# Patient Record
Sex: FEMALE | Race: BLACK OR AFRICAN AMERICAN | URBAN - METROPOLITAN AREA
[De-identification: names, ages, dates, MRNs, and addresses within clinical notes are randomized per-mention and may not be internally consistent; named-entity substitution may affect disease eponyms.]

---

## 2021-08-15 ENCOUNTER — EMERGENCY (EMERGENCY)
Facility: HOSPITAL | Age: 61
LOS: 1 days | Discharge: HOME | End: 2021-08-15
Attending: STUDENT IN AN ORGANIZED HEALTH CARE EDUCATION/TRAINING PROGRAM
Payer: MEDICARE

## 2021-08-15 VITALS
OXYGEN SATURATION: 100 % | HEIGHT: 65 IN | HEART RATE: 91 BPM | WEIGHT: 154.98 LBS | TEMPERATURE: 99 F | RESPIRATION RATE: 20 BRPM | SYSTOLIC BLOOD PRESSURE: 101 MMHG | DIASTOLIC BLOOD PRESSURE: 68 MMHG

## 2021-08-15 LAB
A1C WITH ESTIMATED AVERAGE GLUCOSE RESULT: >16.9 % — HIGH (ref 4–5.6)
ALBUMIN SERPL ELPH-MCNC: 3.9 G/DL — SIGNIFICANT CHANGE UP (ref 3.3–5.2)
ALP SERPL-CCNC: 120 U/L — SIGNIFICANT CHANGE UP (ref 40–120)
ALT FLD-CCNC: 9 U/L — SIGNIFICANT CHANGE UP
ANION GAP SERPL CALC-SCNC: 10 MMOL/L — SIGNIFICANT CHANGE UP (ref 5–17)
AST SERPL-CCNC: 13 U/L — SIGNIFICANT CHANGE UP
BASOPHILS # BLD AUTO: 0.05 K/UL — SIGNIFICANT CHANGE UP (ref 0–0.2)
BASOPHILS NFR BLD AUTO: 1 % — SIGNIFICANT CHANGE UP (ref 0–2)
BILIRUB SERPL-MCNC: 0.2 MG/DL — LOW (ref 0.4–2)
BUN SERPL-MCNC: 11.4 MG/DL — SIGNIFICANT CHANGE UP (ref 8–20)
CALCIUM SERPL-MCNC: 9.7 MG/DL — SIGNIFICANT CHANGE UP (ref 8.6–10.2)
CHLORIDE SERPL-SCNC: 97 MMOL/L — LOW (ref 98–107)
CO2 SERPL-SCNC: 25 MMOL/L — SIGNIFICANT CHANGE UP (ref 22–29)
CREAT SERPL-MCNC: 1.07 MG/DL — SIGNIFICANT CHANGE UP (ref 0.5–1.3)
EOSINOPHIL # BLD AUTO: 0.09 K/UL — SIGNIFICANT CHANGE UP (ref 0–0.5)
EOSINOPHIL NFR BLD AUTO: 1.8 % — SIGNIFICANT CHANGE UP (ref 0–6)
ESTIMATED AVERAGE GLUCOSE: >438 MG/DL — HIGH (ref 68–114)
GLUCOSE SERPL-MCNC: 359 MG/DL — HIGH (ref 70–99)
HCT VFR BLD CALC: 34.6 % — SIGNIFICANT CHANGE UP (ref 34.5–45)
HGB BLD-MCNC: 12.3 G/DL — SIGNIFICANT CHANGE UP (ref 11.5–15.5)
IMM GRANULOCYTES NFR BLD AUTO: 1 % — SIGNIFICANT CHANGE UP (ref 0–1.5)
LYMPHOCYTES # BLD AUTO: 1.17 K/UL — SIGNIFICANT CHANGE UP (ref 1–3.3)
LYMPHOCYTES # BLD AUTO: 22.8 % — SIGNIFICANT CHANGE UP (ref 13–44)
MCHC RBC-ENTMCNC: 29.9 PG — SIGNIFICANT CHANGE UP (ref 27–34)
MCHC RBC-ENTMCNC: 35.5 GM/DL — SIGNIFICANT CHANGE UP (ref 32–36)
MCV RBC AUTO: 84 FL — SIGNIFICANT CHANGE UP (ref 80–100)
MONOCYTES # BLD AUTO: 0.5 K/UL — SIGNIFICANT CHANGE UP (ref 0–0.9)
MONOCYTES NFR BLD AUTO: 9.7 % — SIGNIFICANT CHANGE UP (ref 2–14)
NEUTROPHILS # BLD AUTO: 3.27 K/UL — SIGNIFICANT CHANGE UP (ref 1.8–7.4)
NEUTROPHILS NFR BLD AUTO: 63.7 % — SIGNIFICANT CHANGE UP (ref 43–77)
PLATELET # BLD AUTO: 350 K/UL — SIGNIFICANT CHANGE UP (ref 150–400)
POTASSIUM SERPL-MCNC: 4.8 MMOL/L — SIGNIFICANT CHANGE UP (ref 3.5–5.3)
POTASSIUM SERPL-SCNC: 4.8 MMOL/L — SIGNIFICANT CHANGE UP (ref 3.5–5.3)
PROT SERPL-MCNC: 6.4 G/DL — LOW (ref 6.6–8.7)
RBC # BLD: 4.12 M/UL — SIGNIFICANT CHANGE UP (ref 3.8–5.2)
RBC # FLD: 15.1 % — HIGH (ref 10.3–14.5)
SODIUM SERPL-SCNC: 132 MMOL/L — LOW (ref 135–145)
WBC # BLD: 5.13 K/UL — SIGNIFICANT CHANGE UP (ref 3.8–10.5)
WBC # FLD AUTO: 5.13 K/UL — SIGNIFICANT CHANGE UP (ref 3.8–10.5)

## 2021-08-15 PROCEDURE — 99284 EMERGENCY DEPT VISIT MOD MDM: CPT

## 2021-08-15 PROCEDURE — 99283 EMERGENCY DEPT VISIT LOW MDM: CPT

## 2021-08-15 PROCEDURE — 82962 GLUCOSE BLOOD TEST: CPT

## 2021-08-15 PROCEDURE — 85025 COMPLETE CBC W/AUTO DIFF WBC: CPT

## 2021-08-15 PROCEDURE — U0003: CPT

## 2021-08-15 PROCEDURE — 36415 COLL VENOUS BLD VENIPUNCTURE: CPT

## 2021-08-15 PROCEDURE — 80053 COMPREHEN METABOLIC PANEL: CPT

## 2021-08-15 PROCEDURE — 83036 HEMOGLOBIN GLYCOSYLATED A1C: CPT

## 2021-08-15 PROCEDURE — U0005: CPT

## 2021-08-15 RX ORDER — SODIUM CHLORIDE 9 MG/ML
1000 INJECTION INTRAMUSCULAR; INTRAVENOUS; SUBCUTANEOUS ONCE
Refills: 0 | Status: COMPLETED | OUTPATIENT
Start: 2021-08-15 | End: 2021-08-15

## 2021-08-15 RX ORDER — INSULIN HUMAN 100 [IU]/ML
4 INJECTION, SOLUTION SUBCUTANEOUS ONCE
Refills: 0 | Status: COMPLETED | OUTPATIENT
Start: 2021-08-15 | End: 2021-08-15

## 2021-08-15 RX ORDER — SODIUM CHLORIDE 9 MG/ML
3 INJECTION INTRAMUSCULAR; INTRAVENOUS; SUBCUTANEOUS EVERY 8 HOURS
Refills: 0 | Status: DISCONTINUED | OUTPATIENT
Start: 2021-08-15 | End: 2021-08-20

## 2021-08-15 RX ADMIN — SODIUM CHLORIDE 1000 MILLILITER(S): 9 INJECTION INTRAMUSCULAR; INTRAVENOUS; SUBCUTANEOUS at 21:00

## 2021-08-15 RX ADMIN — SODIUM CHLORIDE 3 MILLILITER(S): 9 INJECTION INTRAMUSCULAR; INTRAVENOUS; SUBCUTANEOUS at 23:50

## 2021-08-15 NOTE — ED PROVIDER NOTE - CLINICAL SUMMARY MEDICAL DECISION MAKING FREE TEXT BOX
Will evaluate for significant electrolyte abnormality given report of recurrent hyperglycemia. Patient encouraged to take medications as prescribed as this would lead to better glycemic control.

## 2021-08-15 NOTE — ED PROVIDER NOTE - PATIENT PORTAL LINK FT
You can access the FollowMyHealth Patient Portal offered by [facility] by registering at the following website: [email or web address]. By joining [facility] FollowMyHealth portal, you will also be able to view your health information using other applications (apps) compatible with our system.

## 2021-08-15 NOTE — ED PROVIDER NOTE - PROGRESS NOTE DETAILS
Labs are as noted. Patient has remained stable. Given patient has not been compliant with her insulin use will give a dose now to aid with glycemia control.

## 2021-08-15 NOTE — ED PROVIDER NOTE - NSFOLLOWUPINSTRUCTIONS_ED_ALL_ED_FT
1) Follow up with your doctor in 1-2 days  2) Return to the ER for worsening or concerning symptoms  3) Please take your insulin as instructed.      Diabetes and Nutrition    WHAT YOU NEED TO KNOW:    Nutrition plans help with healthy eating patterns that improve health. Nutrition plans and regular exercise help keep your blood sugar levels steady. They also help delay or prevent complications of diabetes, such as diabetic kidney disease.    DISCHARGE INSTRUCTIONS:    Call your local emergency number (911 in the ) if:   •You have any of the following signs of a heart attack: ?Squeezing, pressure, or pain in your chest      ?You may also have any of the following: ?Discomfort or pain in your back, neck, jaw, stomach, or arm      ?Shortness of breath      ?Nausea or vomiting      ?Lightheadedness or a sudden cold sweat            Return to the emergency department if:   •You have a low blood sugar level and it does not improve with treatment. Symptoms are trouble thinking, a pounding heartbeat, and sweating.      •Your blood sugar level is above 240 mg/dL and does not come down within 15 minutes of treatment.      •You have ketones in your blood or urine.      •You have nausea or are vomiting and cannot keep any food or liquid down.      •You have blurred or double vision.      •Your breath has a fruity, sweet smell, or your breathing is shallow.      Call your doctor or diabetes care team if:   •Your blood sugar levels are higher than your target goals.      •You often have low blood sugar levels.      •You have trouble coping with diabetes, or you feel anxious or depressed.       •You have questions or concerns about your condition or care.       A dietitian will help you create a nutrition plan to meet your needs and your family's needs. The goal is for you to reach or maintain healthy weight, blood sugar, blood pressure, and lipid levels. You should meet with the dietitian at least 1 time each year. You will learn the following:   •How food affects your blood sugar levels      •How to create healthy eating habits      •How to make food choices based on your activity level, weight, and glucose levels      •How your favorite foods may fit into your plan      •How to keep track of carbohydrates      •Correct portion sizes for each food      •Changes you can make to your plan if you get pregnant or are breastfeeding      Do not skip meals: The goal is to keep your blood sugar level steady. Blood sugar levels may drop too low if you have received insulin and do not eat.    Eat more high-fiber foods: High-fiber foods include fresh or frozen fruits and vegetables, whole-grain breads, and beans. Fiber helps control or lower blood sugar and cholesterol levels. Choose whole fruits instead of fruit juice as much as possible. Sugar may be added to juice, and fiber may be removed.             Choose heart-healthy fats: Foods high in heart-healthy fats include olive oil, nuts, avocados, and fatty fish, such as salmon and tuna. Foods high in unhealthy fats include red meat, full-fat dairy products, and soft margarine. Unhealthy fats can increase your risk for heart disease, increase bad cholesterol, and lower good cholesterol.    Choose complex carbohydrates: Foods with complex carbohydrates include brown rice, whole-grain breads and cereals, and cooked beans. Foods with simple carbohydrates include white bread, white rice, most cold cereals, and snack foods. Your plan will include the amount of carbohydrate to have at one time or in a day. Your blood sugar level can get too high if you eat too much carbohydrate at one time. Blood sugar levels do not spike as high or drop as quickly with complex carbohydrates as with simple carbohydrates. Choose complex carbohydrates whenever possible.    Have less sodium (salt): The risk for high blood pressure (BP) increases with high-sodium foods. Limit high-sodium foods, such as soy sauce, potato chips, and canned soup. Do not add salt to food you cook. Limit your use of table salt. Read labels to have no more than 2,300 milligrams of sodium in one day.             Limit artificial sweeteners: These may be found in food or drinks, such as diet soft drinks or other low-calorie beverages. Artificial sweeteners are low in calories. They may help you lower your overall calories and carbohydrates. It is important not to have more calories from other foods to make up for the calories saved. Artificial sweeteners do not have any nutrition. Eat whole foods and drink water as much as possible. Your plan may include beverages with artificial sweeteners for a short time. These can help you transition from high-sugar beverages to water.    Use the plate method for each meal: This method can help you eat the right amount of carbohydrates and keep your blood sugar levels under control.   •Draw an imaginary line down the middle of a 9-inch dinner plate. On one side, draw another line to divide that section in half. Your plate will have one large section and 2 small sections.       •Fill the largest section with non-starchy vegetables. These include broccoli, spinach, cucumbers, peppers, cauliflower, and tomatoes.       •Add a starch to one of the small sections. Starches include pasta, rice, whole-grain bread, tortillas, corn, potatoes, and beans.       •Add meat or another source of protein to the other small section. Examples include chicken or turkey without skin, fish, lean beef or pork, low-fat cheese, tofu, and eggs.      •Add dairy products or fruit next to your plate if your meal plan allows. Examples of dairy include skim or 1% milk and low-fat yogurt. If you do not drink milk or eat dairy products, you may be able to add another serving of starchy food instead.      •Have a low-calorie or calorie-free drink with your meal. Examples include water or unsweetened tea or coffee.     Plate Method         Know the risks if you choose to drink alcohol: Alcohol can cause hypoglycemia (low blood sugar level), especially if you use insulin. Alcohol can cause high blood sugar and BP levels, and weight gain if you drink too much. Women 21 years or older and men 65 years or older should limit alcohol to 1 drink a day. Men aged 21 to 64 years should limit alcohol to 2 drinks a day. A drink of alcohol is 12 ounces of beer, 5 ounces of wine, or 1½ ounces of liquor. Hypoglycemia can happen hours after you drink alcohol. Check your blood sugar level for several hours after you drink alcohol. Have a source of fast-acting carbohydrates with you in case your level goes too low. You need immediate care if you have signs or symptoms of hypoglycemia, such as sweating, confusion, or fainting.    Maintain a healthy weight: A healthy weight can help you control your diabetes. You can maintain a healthy weight with a nutrition plan and exercise. Ask your healthcare provider how much you should weigh. Ask him or her to help you create a weight loss plan if you are overweight. Together you can set weight loss and maintenance goals.    Follow up with your doctor or diabetes team as directed: Write down your questions so you remember to ask them during your visits.

## 2021-08-15 NOTE — ED PROVIDER NOTE - OBJECTIVE STATEMENT
62 y/o female with PMHx of DM presents to ED c/o hyperglycemia. Patient states for the last day and a half, her blood sugars have been running high, as high as 600. Patient also endorsing weakness, fatigue, and increasing body aches. Patient reports she is in ED to be checked out and is asking for IV to help her sugars come down. Upon further discussion, patient reports she is not taking her insulin correctly. Patient was unable to explain why, just stated she did not feel like it.    Denies headache, neuro symptoms, N/V, change in diet, change in meds, no infectious symptoms, SI/HI, insurance issues or ability to get her meds

## 2021-08-15 NOTE — ED PROVIDER NOTE - RESPIRATORY, MLM
Pt called to schedule TCM with Savanahmaulik on 1/16  [address] [facility], 1/11-1/12 for falling over and passing out  stated she was not diagnosed with anything, had a lot of testing done  stopped one of her medications to see if that will help  Breath sounds clear and equal bilaterally.

## 2021-08-16 VITALS
SYSTOLIC BLOOD PRESSURE: 132 MMHG | TEMPERATURE: 99 F | DIASTOLIC BLOOD PRESSURE: 87 MMHG | RESPIRATION RATE: 18 BRPM | OXYGEN SATURATION: 99 % | HEART RATE: 87 BPM

## 2021-08-16 LAB — SARS-COV-2 RNA SPEC QL NAA+PROBE: SIGNIFICANT CHANGE UP

## 2021-08-16 RX ADMIN — INSULIN HUMAN 4 UNIT(S): 100 INJECTION, SOLUTION SUBCUTANEOUS at 00:01

## 2021-08-16 NOTE — ED ADULT NURSE NOTE - OBJECTIVE STATEMENT
Pt stated that she has recently had low sugar. Pt stated that she has not been taking her medication as prescribed.

## 2021-08-16 NOTE — ED ADULT NURSE NOTE - CHIEF COMPLAINT QUOTE
Patient BIBA to ED today from home after trip and fall she called 911.  Patient reports over the past year her blood sugars have been high, she has felt weak, dizziness and today due to the weakness and dizziness her legs got weak and she fell.  Patient with no LOC.

## 2022-06-20 ENCOUNTER — OFFICE (OUTPATIENT)
Dept: URBAN - METROPOLITAN AREA CLINIC 35 | Facility: CLINIC | Age: 62
Setting detail: OPHTHALMOLOGY
End: 2022-06-20
Payer: COMMERCIAL

## 2022-06-20 DIAGNOSIS — H25.23: ICD-10-CM

## 2022-06-20 DIAGNOSIS — H25.13: ICD-10-CM

## 2022-06-20 DIAGNOSIS — E11.9: ICD-10-CM

## 2022-06-20 PROCEDURE — 92004 COMPRE OPH EXAM NEW PT 1/>: CPT | Performed by: OPHTHALMOLOGY

## 2022-06-20 PROCEDURE — 92134 CPTRZ OPH DX IMG PST SGM RTA: CPT | Performed by: OPHTHALMOLOGY

## 2022-06-20 ASSESSMENT — REFRACTION_MANIFEST
OS_AXIS: 005
OS_SPHERE: -19.25
OD_VA1: 20/UNABLE
OD_AXIS: 007
OD_SPHERE: -19.25
OS_VA1: 20/UNABLE
OD_CYLINDER: +0.25
OS_CYLINDER: +0.25

## 2022-06-20 ASSESSMENT — CONFRONTATIONAL VISUAL FIELD TEST (CVF)
OS_FINDINGS: FULL
OD_FINDINGS: FULL

## 2022-06-20 ASSESSMENT — REFRACTION_AUTOREFRACTION
OS_AXIS: 095
OD_SPHERE: -19.00
OD_AXIS: 097
OS_CYLINDER: -0.25
OS_SPHERE: -19.00
OD_CYLINDER: -0.25

## 2022-06-20 ASSESSMENT — VISUAL ACUITY
OD_BCVA: 20/HM
OS_BCVA: 20/HM

## 2022-06-20 ASSESSMENT — TONOMETRY
OD_IOP_MMHG: 18
OS_IOP_MMHG: 18

## 2022-06-20 ASSESSMENT — SPHEQUIV_DERIVED
OD_SPHEQUIV: -19.125
OS_SPHEQUIV: -19.125
OD_SPHEQUIV: -19.125
OS_SPHEQUIV: -19.125

## 2022-06-20 ASSESSMENT — KERATOMETRY
OS_K2POWER_DIOPTERS: 44.75
OS_AXISANGLE_DEGREES: 120
OS_K1POWER_DIOPTERS: 44.25
OD_K2POWER_DIOPTERS: 44.75
OD_AXISANGLE_DEGREES: 042
OD_K1POWER_DIOPTERS: 44.25

## 2022-06-20 ASSESSMENT — AXIALLENGTH_DERIVED
OD_AL: 33.7156
OS_AL: 33.7156
OD_AL: 33.7156
OS_AL: 33.7156

## 2022-07-06 ENCOUNTER — OFFICE (OUTPATIENT)
Dept: URBAN - METROPOLITAN AREA CLINIC 93 | Facility: CLINIC | Age: 62
Setting detail: OPHTHALMOLOGY
End: 2022-07-06

## 2022-07-06 DIAGNOSIS — H25.23: ICD-10-CM

## 2022-07-06 DIAGNOSIS — E11.9: ICD-10-CM

## 2022-07-06 PROCEDURE — 92012 INTRM OPH EXAM EST PATIENT: CPT | Performed by: OPHTHALMOLOGY

## 2022-07-06 PROCEDURE — 92201 OPSCPY EXTND RTA DRAW UNI/BI: CPT | Performed by: OPHTHALMOLOGY

## 2022-07-06 PROCEDURE — 92134 CPTRZ OPH DX IMG PST SGM RTA: CPT | Performed by: OPHTHALMOLOGY

## 2022-07-06 ASSESSMENT — KERATOMETRY
OD_K1POWER_DIOPTERS: 44.25
OS_K1POWER_DIOPTERS: 44.25
OS_K2POWER_DIOPTERS: 44.75
OS_AXISANGLE_DEGREES: 120
OD_K2POWER_DIOPTERS: 44.75
OD_AXISANGLE_DEGREES: 042

## 2022-07-06 ASSESSMENT — REFRACTION_AUTOREFRACTION
OS_SPHERE: -19.00
OS_CYLINDER: -0.25
OD_CYLINDER: -0.25
OS_AXIS: 095
OD_SPHERE: -19.00
OD_AXIS: 097

## 2022-07-06 ASSESSMENT — CONFRONTATIONAL VISUAL FIELD TEST (CVF)
OS_FINDINGS: FULL
OD_FINDINGS: FULL

## 2022-07-06 ASSESSMENT — AXIALLENGTH_DERIVED
OS_AL: 33.7156
OD_AL: 33.7156

## 2022-07-06 ASSESSMENT — SPHEQUIV_DERIVED
OD_SPHEQUIV: -19.125
OS_SPHEQUIV: -19.125

## 2022-07-06 ASSESSMENT — VISUAL ACUITY
OS_BCVA: 20/HM
OD_BCVA: 20/HM

## 2022-07-11 ENCOUNTER — OFFICE (OUTPATIENT)
Dept: URBAN - METROPOLITAN AREA CLINIC 35 | Facility: CLINIC | Age: 62
Setting detail: OPHTHALMOLOGY
End: 2022-07-11

## 2022-07-11 DIAGNOSIS — H25.13: ICD-10-CM

## 2022-07-11 DIAGNOSIS — E11.9: ICD-10-CM

## 2022-07-11 DIAGNOSIS — H25.23: ICD-10-CM

## 2022-07-11 PROCEDURE — 99213 OFFICE O/P EST LOW 20 MIN: CPT | Performed by: OPHTHALMOLOGY

## 2022-07-11 ASSESSMENT — TONOMETRY
OS_IOP_MMHG: 18
OD_IOP_MMHG: 18

## 2022-07-12 ASSESSMENT — REFRACTION_AUTOREFRACTION
OS_SPHERE: -19.00
OD_AXIS: 096
OD_CYLINDER: -0.25
OS_AXIS: 094
OS_CYLINDER: -0.25
OD_SPHERE: -19.00

## 2022-07-12 ASSESSMENT — KERATOMETRY
OS_K2POWER_DIOPTERS: 45.00
OS_K1POWER_DIOPTERS: 44.50
OD_K2POWER_DIOPTERS: 45.00
OD_AXISANGLE_DEGREES: 070
OD_K1POWER_DIOPTERS: 44.25
OS_AXISANGLE_DEGREES: 106

## 2022-07-12 ASSESSMENT — VISUAL ACUITY
OS_BCVA: 20/HM
OD_BCVA: 20/HM

## 2022-07-12 ASSESSMENT — AXIALLENGTH_DERIVED
OS_AL: 33.5291
OD_AL: 33.6221

## 2022-07-12 ASSESSMENT — SPHEQUIV_DERIVED
OS_SPHEQUIV: -19.125
OD_SPHEQUIV: -19.125